# Patient Record
(demographics unavailable — no encounter records)

---

## 2024-12-12 NOTE — ASSESSMENT
[FreeTextEntry1] : -- Repeat EKG for reported hx of RBBB shows incomplete RBBB in v2, likely normal variant however pt also with sx of chest pain and pressure, will refer to cardiology for eval to r/o underlying structural issue -- Baseline labs -- STI screen [facility] Safe Sex Evidence Based Intervention was applied and a positive reinforcement of safe behavior was provided. -- Start low dose OCP for menstrual mgmt and contraception -- RTC for pap otherwise UTD on HCM, declined vaccinations

## 2024-12-12 NOTE — HISTORY OF PRESENT ILLNESS
[de-identified] : Here to establish care with PCP   Social Hx: Teaches high school history. Lives in [place], with best friend and best friends sister. From [place] originally. Bisexual, has been active with men and women. Last STI testing 3 years ago. Single right now. Uses condoms for pregnancy prevention. Social EtOH, uses marijuana edibles, sometimes vapes. No tobacco or nicotine of any kind. Comes from a very [place] family, "lots of sauce and acid reflex." Has 2 brothers and a sister. Likes to go to concerts and watch sports, into hockey and football.   Mental health Hx: Takes wellbutrin 150 mg, sees psychiatry, dx with depression   Medical hx: Kidney stones, has some small ones still sees urology. Otherwise has been healthy. No significant FHx aware of.   Sexual hx: Active with both men and women, last STI testing years ago. Took OCP and patch in high school now using condoms. The pill affected her mood a bit. Reports period is pretty regular, lasts 3 days first 2 days tend to be heavy and painful, back pain. . Interested in talking about birth control. Reports pap at age 21, was normal. Interested in BC to decrease periods. Pretty good at remembering to take pills. Not interested in nexplanon, friend had a bad experience.   Current concerns: Has been getting occasional chest pain/tightness in the last 2 months. Comes in waves, usually happens later in the day. Does not seem to be related to exercise but does seem to be related to stress. Went to urgent care for feeling tired and chest tightness and had an EKG, was told something was wrong with EKG possibly right bundle branch block, and to f/u with PCP - this was in October.  PCP: Needs baseline labs, did not get HPV vax and declines now, declines flu shot. Needs pap. Needs to see the dentist. Last eye exam was in .

## 2024-12-14 NOTE — HISTORY OF PRESENT ILLNESS
[FreeTextEntry1] : 3/19/2024 - This 23 yo female presents for initial visit s/p ER visit on 2/18/24 were she was diagnosed with ureteral hydronephrosis. Presented to ED for difficulty urinating and severe right flank pain with radiation to right groin.  Hx. of same type of episode 7 years ago with similar results on CT scan (as per mother's recall). CT scan from ED:  Delayed right nephrogram with mild hydroureteronephrosis to the level of the right adnexa without obstructing stone visualized  - seemed to end near the right ovary; pelvic ULS at time negative.  Report further confirmed a 2mm stone visualized in the right interpolar (non-obstructing) noted in [facility] 2 ptiror VCTs - no stones seen but she feels she has passed a stone  does not drink much fluid  PMH: none FH: Mother: kidney stones  12/24 - no further flank pain. and no hematuria, UL today mild fullness with resolution of the hydronephrosis, small twinkle artifacts - had known punctate stone

## 2025-02-18 NOTE — HISTORY OF PRESENT ILLNESS
[de-identified] : Here for f/u Reports doing well on OCP, having some menstrual irregularity even when taking placebo pills, has not had period  Denies any sexual activity since starting pill  Tolerating well overall

## 2025-02-18 NOTE — PHYSICAL EXAM
[External Female Genitalia] : normal external genitalia [Vagina] : normal vaginal exam [Cervix] : normal cervix [Chaperone Present] : A chaperone was present in the examining room during all aspects of the physical examination [32574] : A chaperone was present during the pelvic exam. [FreeTextEntry2] : Shanika Manzo

## 2025-02-18 NOTE — ASSESSMENT
[FreeTextEntry1] : Tolerating OCP - continue Pap well tolerated. Some brown discharge noted. Will f/u results.   Time spent on visit includes review of prior medical records, review of relevant lab results, review of imaging, review of specialist notes, forms/letters as applicable, and documentation.